# Patient Record
Sex: MALE | Race: BLACK OR AFRICAN AMERICAN | Employment: UNEMPLOYED | URBAN - METROPOLITAN AREA
[De-identification: names, ages, dates, MRNs, and addresses within clinical notes are randomized per-mention and may not be internally consistent; named-entity substitution may affect disease eponyms.]

---

## 2020-11-22 ENCOUNTER — APPOINTMENT (OUTPATIENT)
Dept: GENERAL RADIOLOGY | Age: 30
End: 2020-11-22
Payer: COMMERCIAL

## 2020-11-22 ENCOUNTER — HOSPITAL ENCOUNTER (EMERGENCY)
Age: 30
Discharge: HOME | End: 2020-11-22
Payer: COMMERCIAL

## 2020-11-22 VITALS
WEIGHT: 200 LBS | TEMPERATURE: 97.2 F | BODY MASS INDEX: 31.39 KG/M2 | RESPIRATION RATE: 16 BRPM | OXYGEN SATURATION: 97 % | DIASTOLIC BLOOD PRESSURE: 87 MMHG | HEART RATE: 85 BPM | SYSTOLIC BLOOD PRESSURE: 131 MMHG | HEIGHT: 67 IN

## 2020-11-22 LAB
INFLUENZA A BY PCR: NOT DETECTED
INFLUENZA B BY PCR: NOT DETECTED
STREP GRP A PCR: POSITIVE

## 2020-11-22 PROCEDURE — U0003 INFECTIOUS AGENT DETECTION BY NUCLEIC ACID (DNA OR RNA); SEVERE ACUTE RESPIRATORY SYNDROME CORONAVIRUS 2 (SARS-COV-2) (CORONAVIRUS DISEASE [COVID-19]), AMPLIFIED PROBE TECHNIQUE, MAKING USE OF HIGH THROUGHPUT TECHNOLOGIES AS DESCRIBED BY CMS-2020-01-R: HCPCS

## 2020-11-22 PROCEDURE — 87880 STREP A ASSAY W/OPTIC: CPT

## 2020-11-22 PROCEDURE — 6370000000 HC RX 637 (ALT 250 FOR IP): Performed by: NURSE PRACTITIONER

## 2020-11-22 PROCEDURE — 99283 EMERGENCY DEPT VISIT LOW MDM: CPT

## 2020-11-22 PROCEDURE — 71045 X-RAY EXAM CHEST 1 VIEW: CPT

## 2020-11-22 PROCEDURE — 87502 INFLUENZA DNA AMP PROBE: CPT

## 2020-11-22 RX ORDER — IBUPROFEN 600 MG/1
600 TABLET ORAL ONCE
Status: COMPLETED | OUTPATIENT
Start: 2020-11-22 | End: 2020-11-22

## 2020-11-22 RX ORDER — AMOXICILLIN 250 MG/1
500 CAPSULE ORAL ONCE
Status: COMPLETED | OUTPATIENT
Start: 2020-11-22 | End: 2020-11-22

## 2020-11-22 RX ORDER — IBUPROFEN 800 MG/1
800 TABLET ORAL EVERY 8 HOURS PRN
Qty: 15 TABLET | Refills: 0 | Status: SHIPPED | OUTPATIENT
Start: 2020-11-22 | End: 2020-11-27

## 2020-11-22 RX ORDER — ONDANSETRON 4 MG/1
4 TABLET, ORALLY DISINTEGRATING ORAL ONCE
Status: COMPLETED | OUTPATIENT
Start: 2020-11-22 | End: 2020-11-22

## 2020-11-22 RX ORDER — AMOXICILLIN 500 MG/1
500 CAPSULE ORAL 2 TIMES DAILY
Qty: 20 CAPSULE | Refills: 0 | Status: SHIPPED | OUTPATIENT
Start: 2020-11-22 | End: 2020-12-02

## 2020-11-22 RX ORDER — ONDANSETRON 4 MG/1
4 TABLET, ORALLY DISINTEGRATING ORAL EVERY 8 HOURS PRN
Qty: 15 TABLET | Refills: 0 | Status: SHIPPED | OUTPATIENT
Start: 2020-11-22 | End: 2020-11-27

## 2020-11-22 RX ADMIN — IBUPROFEN 600 MG: 600 TABLET, FILM COATED ORAL at 20:15

## 2020-11-22 RX ADMIN — AMOXICILLIN 500 MG: 250 CAPSULE ORAL at 21:06

## 2020-11-22 RX ADMIN — ONDANSETRON 4 MG: 4 TABLET, ORALLY DISINTEGRATING ORAL at 20:16

## 2020-11-22 SDOH — HEALTH STABILITY: MENTAL HEALTH: HOW OFTEN DO YOU HAVE A DRINK CONTAINING ALCOHOL?: NEVER

## 2020-11-22 ASSESSMENT — PAIN SCALES - GENERAL
PAINLEVEL_OUTOF10: 7
PAINLEVEL_OUTOF10: 7

## 2020-11-22 ASSESSMENT — PAIN DESCRIPTION - FREQUENCY: FREQUENCY: CONTINUOUS

## 2020-11-22 ASSESSMENT — PAIN DESCRIPTION - PAIN TYPE: TYPE: ACUTE PAIN

## 2020-11-22 ASSESSMENT — PAIN DESCRIPTION - PROGRESSION: CLINICAL_PROGRESSION: GRADUALLY WORSENING

## 2020-11-22 ASSESSMENT — PAIN DESCRIPTION - DESCRIPTORS: DESCRIPTORS: ACHING

## 2020-11-22 ASSESSMENT — PAIN DESCRIPTION - ONSET: ONSET: SUDDEN

## 2020-11-22 NOTE — LETTER
[address] [facility] Emergency Department  [id number] [facility] [address]  Phone: [phone]             November 22, 2020    Patient: Faustino Palacio   YOB: 1990   Date of Visit: 11/22/2020       To Whom It May Concern:    Jania Diaz was seen and treated in our emergency department on 11/22/2020. Sheldon Cash was tested for COVID-19. He has been instructed to self isolate until results.       Sincerely,             Signature:__________________________________

## 2020-11-23 ENCOUNTER — CARE COORDINATION (OUTPATIENT)
Dept: CARE COORDINATION | Age: 30
End: 2020-11-23

## 2020-11-23 NOTE — CARE COORDINATION
Call #1    Attempted outreach to complete ED Follow-up for At Risk COVID-19. Unable to leave message, invalid number.     PLAN    Resolve episode

## 2020-11-23 NOTE — ED PROVIDER NOTES
[facility]       Department of Emergency Medicine   ED  Provider Note  Admit Date/RoomTime: 11/22/2020  7:02 PM  ED Room: 04/04   Chief Complaint   Pharyngitis (with vomiting, loss of taste and smell. Was around a person who is covid positive. )    History of Present Illness   Source of history provided by:  patient. History/Exam Limitations: none. Partha Mathur is a 31 y.o. old male who has a past medical history of: History reviewed. No pertinent past medical history. presents to the emergency department for concern for COVID-19. He reports that his cousin tested positive. States that he had last contact with his cousin 4 days ago. States that 2 days ago he began to have symptoms which included sore throat, body aches, fever, loss of smell and taste. He reports that today he had 2 episodes of emesis. No hematemesis or coffee-ground emesis. No fevers today. No difficulty swallowing. Denies diarrhea. No abdominal pain. No complaint of chest pain or shortness of breath. He reports no medical history. No neck pain or stiffness. Since onset the symptoms have been intermittent and mild-moderate in severity. The symptoms are aggravated by nothing and relieved by nothing. ROS   Pertinent positives and negatives are stated within HPI, all other systems reviewed and are negative. History reviewed. No pertinent surgical history. Social History:  reports that he has never smoked. He does not have any smokeless tobacco history on file. He reports that he does not drink alcohol or use drugs. Family History: family history is not on file. Allergies: Patient has no known allergies.     Physical Exam           ED Triage Vitals   BP Temp Temp Source Pulse Resp SpO2 Height Weight   11/22/20 1848 11/22/20 1848 11/22/20 1848 11/22/20 1848 11/22/20 1848 11/22/20 1848 11/22/20 1856 11/22/20 1856   131/87 96.9 °F (36.1 °C) Infrared 90 16 95 % 5' 7\" (1.702 m) 200 lb (90.7 kg)      Oxygen Saturation Interpretation: Normal.    Constitutional:  Alert, development consistent with age. HEENT:  NC/NT. Airway patent. Bilateral TMs without perforation, bulging, injection, or drainage. Oropharynx mild erythema. No hypertrophy or exudate. Neck:  Normal ROM. Supple. No meningeal signs. Respiratory:  Breath sounds: equal bilaterally. Lung sounds: normal.   CV:  Regular rate and rhythm, normal heart sounds, without pathological murmurs, ectopy, gallops, or rubs. .  GI:  Abdomen Soft, nontender, good bowel sounds. No firm or pulsatile mass. Integument:  Normal turgor. Warm, dry, without visible rash. Lymphatic:  Edema:  none Bilateral lower extremity(s). Neurological:  Oriented. Motor functions intact. Lab / Imaging Results   (All laboratory and radiology results have been personally reviewed by myself)  Labs:  Results for orders placed or performed during the hospital encounter of 11/22/20   Strep Screen Group A Throat    Specimen: Throat   Result Value Ref Range    Strep Grp A PCR POSITIVE Negative   Rapid influenza A/B antigens    Specimen: Nasopharyngeal   Result Value Ref Range    Influenza A by PCR Not Detected Not Detected    Influenza B by PCR Not Detected Not Detected   Covid-19 Ambulatory   Result Value Ref Range    Source NP swab      Imaging: All Radiology results interpreted by Radiologist unless otherwise noted. XR CHEST PORTABLE   Final Result   No pneumonia or pleural effusion. ED Course / Medical Decision Making     Medications   ibuprofen (ADVIL;MOTRIN) tablet 600 mg (600 mg Oral Given 11/22/20 2015)   ondansetron (ZOFRAN-ODT) disintegrating tablet 4 mg (4 mg Oral Given 11/22/20 2016)   amoxicillin (AMOXIL) capsule 500 mg (500 mg Oral Given 11/22/20 2106)     2050: He ambulated in the emergency department and maintained O2 saturation of 98% without any complaint of dyspnea or chest pain.      Consult(s):   None    Procedure(s):   none    Medical Decision Making:    Jeanette Soto Rebecca Johnson is a 62-year-old male who presented to the emergency department for complaint of concern for COVID-19. He reported that his cousin tested positive and he was exposed 4 days ago. Reports onset of symptoms 2 days ago which included sore throat, body aches, loss of smell and taste and fever 2 days ago. Denied risk for the past 24 hours. No difficulty swallowing. No neck pain/stiffness. No meningeal signs. There is been no complaint of cough. No chest pain or shortness of breath. He reported nausea with 2 episodes of emesis today. No hematemesis or coffee-ground emesis. No melena or hematochezia. No diarrhea. Rapid strep positive. He was started on amoxicillin. Influenza A&B negative. X-ray negative for any acute findings. COVID-19 test obtained and pending results. He was instructed to self isolate until test results. He verbalized understanding agrees with plan. He tolerated p.o. fluids while in the ED. He remained hemodynamically stable, not hypoxic, nontoxic, and appropriate for outpatient management. Instructed to have close follow-up with his PCP and advised to return for any new, change, worsening symptoms or concerns. At this time the patient is without objective evidence of an acute process requiring hospitalization or inpatient management. They have remained hemodynamically stable throughout their entire ED visit and are stable for discharge with outpatient follow-up. The plan has been discussed in detail and they are aware of the specific conditions for emergent return, as well as the importance of follow-up. Counseling: The emergency provider has spoken with the patient and discussed todays results, in addition to providing specific details for the plan of care and counseling regarding the diagnosis and prognosis. Questions are answered at this time and they are agreeable with the plan. Assessment      1. Strep pharyngitis    2.  Suspected COVID-19 virus

## 2020-11-24 LAB
SARS-COV-2: DETECTED
SOURCE: ABNORMAL

## 2020-11-27 NOTE — RESULT ENCOUNTER NOTE
Notified patient of positive results. Encouraged pt to follow CDC guidelines for quarantine from onset of symptoms.